# Patient Record
Sex: MALE | Race: WHITE | NOT HISPANIC OR LATINO | Employment: FULL TIME | URBAN - METROPOLITAN AREA
[De-identification: names, ages, dates, MRNs, and addresses within clinical notes are randomized per-mention and may not be internally consistent; named-entity substitution may affect disease eponyms.]

---

## 2023-05-04 ENCOUNTER — TELEPHONE (OUTPATIENT)
Dept: HEMATOLOGY/ONCOLOGY | Facility: CLINIC | Age: 53
End: 2023-05-04
Payer: COMMERCIAL

## 2023-05-04 DIAGNOSIS — L98.9 SKIN LESION: Primary | ICD-10-CM

## 2023-05-05 NOTE — NURSING
Per Dr. Dunbar, schedule patient in clinic to evaluate skin lesion. Appointment date, time and location provided to patient and he verbalized understanding    Oncology Navigation   Intake  Cancer Type: Head and Neck  Internal / External Referral: Internal  Date of Referral: 05/04/23  Initial Nurse Navigator Contact: 05/04/23  Referral to Initial Contact Timeline (days): 0  Date Worked: 05/05/23  First Appointment Available: 05/08/23  Appointment Date: 05/08/23  Schedule to Appointment Timeline (days): 3  First Available Date vs. Scheduled Date (days): 0     Treatment                              Acuity      Follow Up  No follow-ups on file.

## 2023-05-08 ENCOUNTER — TELEPHONE (OUTPATIENT)
Dept: HEMATOLOGY/ONCOLOGY | Facility: CLINIC | Age: 53
End: 2023-05-08
Payer: COMMERCIAL

## 2023-05-08 NOTE — NURSING
Noted that patient cancelled appt with Dr. Dunbar today via My Chart. Left msg informing him to contact navigation office if he wishes to rescheduled with Dr. Dunbar.

## 2023-06-06 ENCOUNTER — PATIENT MESSAGE (OUTPATIENT)
Dept: DERMATOLOGY | Facility: CLINIC | Age: 53
End: 2023-06-06
Payer: COMMERCIAL

## 2024-02-19 ENCOUNTER — TELEPHONE (OUTPATIENT)
Dept: UROLOGY | Facility: CLINIC | Age: 54
End: 2024-02-19
Payer: COMMERCIAL

## 2024-02-19 ENCOUNTER — PATIENT MESSAGE (OUTPATIENT)
Dept: UROLOGY | Facility: CLINIC | Age: 54
End: 2024-02-19
Payer: COMMERCIAL

## 2024-03-05 ENCOUNTER — TELEPHONE (OUTPATIENT)
Dept: UROLOGY | Facility: CLINIC | Age: 54
End: 2024-03-05
Payer: COMMERCIAL

## 2024-03-05 ENCOUNTER — PATIENT MESSAGE (OUTPATIENT)
Dept: UROLOGY | Facility: CLINIC | Age: 54
End: 2024-03-05
Payer: COMMERCIAL

## 2024-03-05 DIAGNOSIS — R97.20 ELEVATED PSA: Primary | ICD-10-CM

## 2024-03-05 NOTE — TELEPHONE ENCOUNTER
----- Message from Dez Randolph MD sent at 3/5/2024  7:54 AM CST -----  Discussed results with the patient. Please arrange a uronav biopsy

## 2024-04-05 PROBLEM — R97.20 ELEVATED PSA: Status: ACTIVE | Noted: 2024-04-05

## 2024-04-09 PROBLEM — Z12.11 COLON CANCER SCREENING: Status: ACTIVE | Noted: 2024-04-09
